# Patient Record
Sex: FEMALE | Race: NATIVE HAWAIIAN OR OTHER PACIFIC ISLANDER
[De-identification: names, ages, dates, MRNs, and addresses within clinical notes are randomized per-mention and may not be internally consistent; named-entity substitution may affect disease eponyms.]

---

## 2017-01-25 ENCOUNTER — HOSPITAL ENCOUNTER (EMERGENCY)
Dept: HOSPITAL 67 - ED | Age: 82
Discharge: HOME | End: 2017-01-25
Payer: COMMERCIAL

## 2017-01-25 VITALS — SYSTOLIC BLOOD PRESSURE: 164 MMHG | DIASTOLIC BLOOD PRESSURE: 54 MMHG | TEMPERATURE: 97.7 F

## 2017-01-25 VITALS — WEIGHT: 145 LBS | BODY MASS INDEX: 28.47 KG/M2 | HEIGHT: 60 IN

## 2017-01-25 DIAGNOSIS — R79.89: ICD-10-CM
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
## 2017-01-25 DIAGNOSIS — R00.1: ICD-10-CM

## 2017-01-25 DIAGNOSIS — R53.1: Primary | ICD-10-CM

## 2017-01-25 LAB
APTT BLD: 25 SECONDS (ref 24.5–33.6)
PLATELET # BLD: 189 K/UL (ref 152–353)
POTASSIUM SERPL-SCNC: 4.7 MMOL/L (ref 3.6–5.2)
SODIUM SERPL-SCNC: 134 MMOL/L (ref 136–145)

## 2017-01-25 PROCEDURE — 36415 COLL VENOUS BLD VENIPUNCTURE: CPT

## 2017-01-25 PROCEDURE — 85027 COMPLETE CBC AUTOMATED: CPT

## 2017-01-25 PROCEDURE — 99284 EMERGENCY DEPT VISIT MOD MDM: CPT

## 2017-01-25 PROCEDURE — 81000 URINALYSIS NONAUTO W/SCOPE: CPT

## 2017-01-25 PROCEDURE — 96360 HYDRATION IV INFUSION INIT: CPT

## 2017-01-25 PROCEDURE — 84484 ASSAY OF TROPONIN QUANT: CPT

## 2017-01-25 PROCEDURE — 80053 COMPREHEN METABOLIC PANEL: CPT

## 2017-01-25 PROCEDURE — 93005 ELECTROCARDIOGRAM TRACING: CPT

## 2017-01-25 PROCEDURE — 85610 PROTHROMBIN TIME: CPT

## 2017-01-25 PROCEDURE — 82550 ASSAY OF CK (CPK): CPT

## 2017-01-25 PROCEDURE — 85730 THROMBOPLASTIN TIME PARTIAL: CPT

## 2017-05-15 ENCOUNTER — HOSPITAL ENCOUNTER (OUTPATIENT)
Dept: HOSPITAL 67 - AMB | Age: 82
Discharge: ANOTHER HOSPITAL | End: 2017-05-15
Payer: COMMERCIAL

## 2017-05-15 ENCOUNTER — HOSPITAL ENCOUNTER (INPATIENT)
Dept: HOSPITAL 67 - ED | Age: 82
LOS: 4 days | Discharge: HOME | DRG: 639 | End: 2017-05-19
Attending: INTERNAL MEDICINE | Admitting: EMERGENCY MEDICINE
Payer: COMMERCIAL

## 2017-05-15 VITALS
HEIGHT: 62 IN | BODY MASS INDEX: 25.4 KG/M2 | SYSTOLIC BLOOD PRESSURE: 138 MMHG | WEIGHT: 138.06 LBS | HEIGHT: 62 IN | DIASTOLIC BLOOD PRESSURE: 89 MMHG | WEIGHT: 138.06 LBS | BODY MASS INDEX: 25.4 KG/M2 | TEMPERATURE: 98.3 F

## 2017-05-15 DIAGNOSIS — E11.649: Primary | ICD-10-CM

## 2017-05-15 DIAGNOSIS — I12.9: ICD-10-CM

## 2017-05-15 DIAGNOSIS — R41.82: ICD-10-CM

## 2017-05-15 DIAGNOSIS — N18.4: ICD-10-CM

## 2017-05-15 DIAGNOSIS — K21.9: ICD-10-CM

## 2017-05-15 DIAGNOSIS — I25.2: ICD-10-CM

## 2017-05-15 DIAGNOSIS — E16.1: Primary | ICD-10-CM

## 2017-05-15 LAB
PLATELET # BLD: 233 K/UL (ref 152–353)
POTASSIUM SERPL-SCNC: 4.1 MMOL/L (ref 3.6–5.2)
SODIUM SERPL-SCNC: 136 MMOL/L (ref 136–145)

## 2017-05-15 PROCEDURE — 96366 THER/PROPH/DIAG IV INF ADDON: CPT

## 2017-05-15 PROCEDURE — 83735 ASSAY OF MAGNESIUM: CPT

## 2017-05-15 PROCEDURE — 96360 HYDRATION IV INFUSION INIT: CPT

## 2017-05-15 PROCEDURE — A0425 GROUND MILEAGE: HCPCS

## 2017-05-15 PROCEDURE — 80053 COMPREHEN METABOLIC PANEL: CPT

## 2017-05-15 PROCEDURE — 96372 THER/PROPH/DIAG INJ SC/IM: CPT

## 2017-05-15 PROCEDURE — 85027 COMPLETE CBC AUTOMATED: CPT

## 2017-05-15 PROCEDURE — 81000 URINALYSIS NONAUTO W/SCOPE: CPT

## 2017-05-15 PROCEDURE — 96365 THER/PROPH/DIAG IV INF INIT: CPT

## 2017-05-15 PROCEDURE — 36415 COLL VENOUS BLD VENIPUNCTURE: CPT

## 2017-05-15 PROCEDURE — A0427 ALS1-EMERGENCY: HCPCS

## 2017-05-15 PROCEDURE — 83036 HEMOGLOBIN GLYCOSYLATED A1C: CPT

## 2017-05-15 PROCEDURE — 99284 EMERGENCY DEPT VISIT MOD MDM: CPT

## 2017-05-15 PROCEDURE — 80048 BASIC METABOLIC PNL TOTAL CA: CPT

## 2017-05-15 PROCEDURE — 82948 REAGENT STRIP/BLOOD GLUCOSE: CPT

## 2017-05-16 VITALS — SYSTOLIC BLOOD PRESSURE: 164 MMHG | DIASTOLIC BLOOD PRESSURE: 58 MMHG | TEMPERATURE: 99 F

## 2017-05-16 VITALS — TEMPERATURE: 98.1 F | SYSTOLIC BLOOD PRESSURE: 184 MMHG | DIASTOLIC BLOOD PRESSURE: 60 MMHG

## 2017-05-16 VITALS — TEMPERATURE: 98 F | DIASTOLIC BLOOD PRESSURE: 45 MMHG | SYSTOLIC BLOOD PRESSURE: 126 MMHG

## 2017-05-16 VITALS — TEMPERATURE: 99 F | SYSTOLIC BLOOD PRESSURE: 158 MMHG | DIASTOLIC BLOOD PRESSURE: 53 MMHG

## 2017-05-16 VITALS — DIASTOLIC BLOOD PRESSURE: 48 MMHG | TEMPERATURE: 98.9 F | SYSTOLIC BLOOD PRESSURE: 147 MMHG

## 2017-05-16 VITALS — DIASTOLIC BLOOD PRESSURE: 56 MMHG | SYSTOLIC BLOOD PRESSURE: 150 MMHG | TEMPERATURE: 99.2 F

## 2017-05-16 VITALS — TEMPERATURE: 98.4 F | SYSTOLIC BLOOD PRESSURE: 146 MMHG | DIASTOLIC BLOOD PRESSURE: 52 MMHG

## 2017-05-16 LAB
PLATELET # BLD: 178 K/UL (ref 152–353)
POTASSIUM SERPL-SCNC: 3.6 MMOL/L (ref 3.6–5.2)
SODIUM SERPL-SCNC: 126 MMOL/L (ref 136–145)

## 2017-05-17 VITALS — TEMPERATURE: 98.9 F | SYSTOLIC BLOOD PRESSURE: 156 MMHG | DIASTOLIC BLOOD PRESSURE: 43 MMHG

## 2017-05-17 VITALS — TEMPERATURE: 99.4 F | DIASTOLIC BLOOD PRESSURE: 50 MMHG | SYSTOLIC BLOOD PRESSURE: 180 MMHG

## 2017-05-17 VITALS — TEMPERATURE: 98.7 F | SYSTOLIC BLOOD PRESSURE: 152 MMHG | DIASTOLIC BLOOD PRESSURE: 53 MMHG

## 2017-05-17 VITALS — DIASTOLIC BLOOD PRESSURE: 54 MMHG | SYSTOLIC BLOOD PRESSURE: 152 MMHG | TEMPERATURE: 99.2 F

## 2017-05-17 VITALS — DIASTOLIC BLOOD PRESSURE: 40 MMHG | TEMPERATURE: 98.7 F | SYSTOLIC BLOOD PRESSURE: 155 MMHG

## 2017-05-17 VITALS — DIASTOLIC BLOOD PRESSURE: 47 MMHG | TEMPERATURE: 99.4 F | SYSTOLIC BLOOD PRESSURE: 150 MMHG

## 2017-05-17 LAB
PLATELET # BLD: 156 K/UL (ref 152–353)
POTASSIUM SERPL-SCNC: 4.1 MMOL/L (ref 3.6–5.2)
SODIUM SERPL-SCNC: 124 MMOL/L (ref 136–145)

## 2017-05-18 VITALS — TEMPERATURE: 97.8 F | SYSTOLIC BLOOD PRESSURE: 182 MMHG | DIASTOLIC BLOOD PRESSURE: 72 MMHG

## 2017-05-18 VITALS — SYSTOLIC BLOOD PRESSURE: 185 MMHG | TEMPERATURE: 98 F | DIASTOLIC BLOOD PRESSURE: 70 MMHG

## 2017-05-18 VITALS — DIASTOLIC BLOOD PRESSURE: 48 MMHG | SYSTOLIC BLOOD PRESSURE: 156 MMHG | TEMPERATURE: 99.1 F

## 2017-05-18 VITALS — SYSTOLIC BLOOD PRESSURE: 172 MMHG | TEMPERATURE: 97.6 F | DIASTOLIC BLOOD PRESSURE: 65 MMHG

## 2017-05-18 VITALS — TEMPERATURE: 98.5 F | DIASTOLIC BLOOD PRESSURE: 54 MMHG | SYSTOLIC BLOOD PRESSURE: 149 MMHG

## 2017-05-18 VITALS — DIASTOLIC BLOOD PRESSURE: 57 MMHG | TEMPERATURE: 98.9 F | SYSTOLIC BLOOD PRESSURE: 157 MMHG

## 2017-05-18 LAB
PLATELET # BLD: 146 K/UL (ref 152–353)
POTASSIUM SERPL-SCNC: 4.3 MMOL/L (ref 3.6–5.2)
SODIUM SERPL-SCNC: 135 MMOL/L (ref 136–145)

## 2017-05-18 NOTE — NUR
WENT INTO PT'S ROOM TO STOP IV FROM BEEPING AND PT WAS LYING IN BED BUT HAD AN
ACCIDENT IN BATHROOM. LARGE BM ON FLOOR IN BATHROOM AND FROM BR TO BED. PT
GIVEN BATH AND LINENS CHANGED. FLOOR CLEANED PER HOUSEKEEPING. PT REORIENTED
TO CALL LIGHT AND PT VERBALZIES UNDERSTANDING.

## 2017-05-19 VITALS — SYSTOLIC BLOOD PRESSURE: 170 MMHG | DIASTOLIC BLOOD PRESSURE: 54 MMHG | TEMPERATURE: 99.3 F

## 2017-05-19 VITALS — SYSTOLIC BLOOD PRESSURE: 173 MMHG | TEMPERATURE: 98.8 F | DIASTOLIC BLOOD PRESSURE: 62 MMHG

## 2017-05-19 VITALS — DIASTOLIC BLOOD PRESSURE: 69 MMHG | TEMPERATURE: 98 F | SYSTOLIC BLOOD PRESSURE: 105 MMHG

## 2017-05-19 VITALS — SYSTOLIC BLOOD PRESSURE: 162 MMHG | TEMPERATURE: 98 F | DIASTOLIC BLOOD PRESSURE: 57 MMHG

## 2017-05-19 LAB
PLATELET # BLD: 156 K/UL (ref 152–353)
POTASSIUM SERPL-SCNC: 3.8 MMOL/L (ref 3.6–5.2)
SODIUM SERPL-SCNC: 137 MMOL/L (ref 136–145)

## 2017-05-19 NOTE — NUR
RESTING IN BED  HOB UP EATING BREAKFAST. NO COMPLAINTS BLOOD SUGAR  112 THIS
AM.  IV FLUIDS INFUSING WITHOUT DIFFICULTY.  FAMILY MEMBERS AT BEDSIDE.

## 2017-05-19 NOTE — NUR
RECIEVED ORDERS EARLIER PT TO BE DISCHARGED. IV D/C CATH INTACT NO REDNESS NO
SWELLING AT SITE. FAMILY MEMBERS HERE TO GET PT. DISCHARGE ORDERS REVIEWED
JOEL THAYER RN.  PT DISCHARGED VIA W/C TO PRIVATE CAR TO GO HOME.

## 2017-08-08 ENCOUNTER — HOSPITAL ENCOUNTER (OUTPATIENT)
Dept: HOSPITAL 67 - RAD | Age: 82
Discharge: HOME | End: 2017-08-08
Attending: NURSE PRACTITIONER
Payer: COMMERCIAL

## 2017-08-08 DIAGNOSIS — M79.642: ICD-10-CM

## 2017-08-08 DIAGNOSIS — M25.511: ICD-10-CM

## 2017-08-08 DIAGNOSIS — M25.512: ICD-10-CM

## 2017-08-08 DIAGNOSIS — M25.532: ICD-10-CM

## 2017-08-08 DIAGNOSIS — M25.531: ICD-10-CM

## 2017-08-08 DIAGNOSIS — M79.641: Primary | ICD-10-CM

## 2017-11-25 ENCOUNTER — HOSPITAL ENCOUNTER (EMERGENCY)
Dept: HOSPITAL 67 - ED | Age: 82
Discharge: HOME | End: 2017-11-25
Payer: COMMERCIAL

## 2017-11-25 ENCOUNTER — HOSPITAL ENCOUNTER (OUTPATIENT)
Dept: HOSPITAL 67 - AMB | Age: 82
Discharge: ANOTHER HOSPITAL | End: 2017-11-25
Payer: COMMERCIAL

## 2017-11-25 VITALS — DIASTOLIC BLOOD PRESSURE: 56 MMHG | SYSTOLIC BLOOD PRESSURE: 135 MMHG | TEMPERATURE: 97.4 F

## 2017-11-25 VITALS — HEIGHT: 62 IN | BODY MASS INDEX: 28.52 KG/M2 | WEIGHT: 155 LBS

## 2017-11-25 DIAGNOSIS — R53.1: ICD-10-CM

## 2017-11-25 DIAGNOSIS — R00.1: ICD-10-CM

## 2017-11-25 DIAGNOSIS — R55: Primary | ICD-10-CM

## 2017-11-25 DIAGNOSIS — E86.0: ICD-10-CM

## 2017-11-25 LAB
APTT BLD: 21.5 SECONDS (ref 24.5–33.6)
PLATELET # BLD: 225 K/UL (ref 152–353)
POTASSIUM SERPL-SCNC: 5.4 MMOL/L (ref 3.6–5.2)
SODIUM SERPL-SCNC: 133 MMOL/L (ref 136–145)

## 2017-11-25 PROCEDURE — A0425 GROUND MILEAGE: HCPCS

## 2017-11-25 PROCEDURE — 85610 PROTHROMBIN TIME: CPT

## 2017-11-25 PROCEDURE — A0427 ALS1-EMERGENCY: HCPCS

## 2017-11-25 PROCEDURE — 93005 ELECTROCARDIOGRAM TRACING: CPT

## 2017-11-25 PROCEDURE — 85730 THROMBOPLASTIN TIME PARTIAL: CPT

## 2017-11-25 PROCEDURE — 99284 EMERGENCY DEPT VISIT MOD MDM: CPT

## 2017-11-25 PROCEDURE — 85027 COMPLETE CBC AUTOMATED: CPT

## 2017-11-25 PROCEDURE — 96360 HYDRATION IV INFUSION INIT: CPT

## 2017-11-25 PROCEDURE — 82550 ASSAY OF CK (CPK): CPT

## 2017-11-25 PROCEDURE — 80053 COMPREHEN METABOLIC PANEL: CPT

## 2017-11-25 PROCEDURE — 84484 ASSAY OF TROPONIN QUANT: CPT

## 2017-11-25 PROCEDURE — 81000 URINALYSIS NONAUTO W/SCOPE: CPT

## 2018-09-21 ENCOUNTER — HOSPITAL ENCOUNTER (INPATIENT)
Dept: HOSPITAL 67 - MED/SURG | Age: 83
LOS: 8 days | Discharge: HOME | DRG: 65 | End: 2018-09-29
Attending: GENERAL PRACTICE | Admitting: NURSE PRACTITIONER
Payer: COMMERCIAL

## 2018-09-21 VITALS — TEMPERATURE: 97.4 F | SYSTOLIC BLOOD PRESSURE: 122 MMHG | DIASTOLIC BLOOD PRESSURE: 75 MMHG

## 2018-09-21 VITALS
HEIGHT: 62 IN | BODY MASS INDEX: 25.3 KG/M2 | HEIGHT: 62 IN | BODY MASS INDEX: 25.3 KG/M2 | WEIGHT: 137.5 LBS | WEIGHT: 137.5 LBS

## 2018-09-21 VITALS — SYSTOLIC BLOOD PRESSURE: 136 MMHG | DIASTOLIC BLOOD PRESSURE: 74 MMHG | TEMPERATURE: 98.4 F

## 2018-09-21 DIAGNOSIS — N18.4: ICD-10-CM

## 2018-09-21 DIAGNOSIS — E86.0: ICD-10-CM

## 2018-09-21 DIAGNOSIS — E11.22: ICD-10-CM

## 2018-09-21 DIAGNOSIS — I63.8: Primary | ICD-10-CM

## 2018-09-21 DIAGNOSIS — I12.9: ICD-10-CM

## 2018-09-21 DIAGNOSIS — N39.0: ICD-10-CM

## 2018-09-21 DIAGNOSIS — M15.8: ICD-10-CM

## 2018-09-21 DIAGNOSIS — E11.649: ICD-10-CM

## 2018-09-21 DIAGNOSIS — R60.9: ICD-10-CM

## 2018-09-21 LAB
APTT BLD: 23.2 SECONDS (ref 24.5–33.6)
PLATELET # BLD: 171 K/UL (ref 152–353)
POTASSIUM SERPL-SCNC: 4.9 MMOL/L (ref 3.6–5.2)
SODIUM SERPL-SCNC: 133 MMOL/L (ref 136–145)

## 2018-09-21 PROCEDURE — 84443 ASSAY THYROID STIM HORMONE: CPT

## 2018-09-21 PROCEDURE — 82746 ASSAY OF FOLIC ACID SERUM: CPT

## 2018-09-21 PROCEDURE — 36415 COLL VENOUS BLD VENIPUNCTURE: CPT

## 2018-09-21 PROCEDURE — 82272 OCCULT BLD FECES 1-3 TESTS: CPT

## 2018-09-21 PROCEDURE — 85027 COMPLETE CBC AUTOMATED: CPT

## 2018-09-21 PROCEDURE — 81000 URINALYSIS NONAUTO W/SCOPE: CPT

## 2018-09-21 PROCEDURE — 85610 PROTHROMBIN TIME: CPT

## 2018-09-21 PROCEDURE — 82948 REAGENT STRIP/BLOOD GLUCOSE: CPT

## 2018-09-21 PROCEDURE — 80053 COMPREHEN METABOLIC PANEL: CPT

## 2018-09-21 PROCEDURE — 83735 ASSAY OF MAGNESIUM: CPT

## 2018-09-21 PROCEDURE — 82607 VITAMIN B-12: CPT

## 2018-09-21 PROCEDURE — 85730 THROMBOPLASTIN TIME PARTIAL: CPT

## 2018-09-21 PROCEDURE — 96372 THER/PROPH/DIAG INJ SC/IM: CPT

## 2018-09-21 PROCEDURE — 93005 ELECTROCARDIOGRAM TRACING: CPT

## 2018-09-22 VITALS — SYSTOLIC BLOOD PRESSURE: 153 MMHG | DIASTOLIC BLOOD PRESSURE: 54 MMHG | TEMPERATURE: 98 F

## 2018-09-22 VITALS — DIASTOLIC BLOOD PRESSURE: 68 MMHG | TEMPERATURE: 97.9 F | SYSTOLIC BLOOD PRESSURE: 130 MMHG

## 2018-09-22 VITALS — SYSTOLIC BLOOD PRESSURE: 137 MMHG | DIASTOLIC BLOOD PRESSURE: 52 MMHG | TEMPERATURE: 98.1 F

## 2018-09-22 VITALS — SYSTOLIC BLOOD PRESSURE: 132 MMHG | DIASTOLIC BLOOD PRESSURE: 60 MMHG | TEMPERATURE: 98.4 F

## 2018-09-22 VITALS — TEMPERATURE: 98.4 F | DIASTOLIC BLOOD PRESSURE: 65 MMHG | SYSTOLIC BLOOD PRESSURE: 145 MMHG

## 2018-09-22 VITALS — DIASTOLIC BLOOD PRESSURE: 55 MMHG | TEMPERATURE: 98.1 F | SYSTOLIC BLOOD PRESSURE: 115 MMHG

## 2018-09-22 LAB
PLATELET # BLD: 154 K/UL (ref 152–353)
POTASSIUM SERPL-SCNC: 4 MMOL/L (ref 3.6–5.2)
SODIUM SERPL-SCNC: 137 MMOL/L (ref 136–145)

## 2018-09-23 VITALS — DIASTOLIC BLOOD PRESSURE: 70 MMHG | TEMPERATURE: 98 F | SYSTOLIC BLOOD PRESSURE: 157 MMHG

## 2018-09-23 VITALS — SYSTOLIC BLOOD PRESSURE: 142 MMHG | DIASTOLIC BLOOD PRESSURE: 53 MMHG | TEMPERATURE: 98.2 F

## 2018-09-23 VITALS — TEMPERATURE: 98.2 F | DIASTOLIC BLOOD PRESSURE: 50 MMHG | SYSTOLIC BLOOD PRESSURE: 162 MMHG

## 2018-09-23 VITALS — DIASTOLIC BLOOD PRESSURE: 63 MMHG | TEMPERATURE: 98 F | SYSTOLIC BLOOD PRESSURE: 133 MMHG

## 2018-09-23 VITALS — TEMPERATURE: 98.3 F | DIASTOLIC BLOOD PRESSURE: 65 MMHG | SYSTOLIC BLOOD PRESSURE: 145 MMHG

## 2018-09-23 VITALS — SYSTOLIC BLOOD PRESSURE: 140 MMHG | DIASTOLIC BLOOD PRESSURE: 50 MMHG | TEMPERATURE: 97.8 F

## 2018-09-23 VITALS — DIASTOLIC BLOOD PRESSURE: 57 MMHG | TEMPERATURE: 98.5 F | SYSTOLIC BLOOD PRESSURE: 156 MMHG

## 2018-09-23 LAB
PLATELET # BLD: 148 K/UL (ref 152–353)
POTASSIUM SERPL-SCNC: 4.2 MMOL/L (ref 3.6–5.2)
SODIUM SERPL-SCNC: 141 MMOL/L (ref 136–145)

## 2018-09-24 VITALS — TEMPERATURE: 98.5 F | SYSTOLIC BLOOD PRESSURE: 141 MMHG | DIASTOLIC BLOOD PRESSURE: 58 MMHG

## 2018-09-24 VITALS — DIASTOLIC BLOOD PRESSURE: 60 MMHG | SYSTOLIC BLOOD PRESSURE: 143 MMHG | TEMPERATURE: 97.9 F

## 2018-09-24 VITALS — DIASTOLIC BLOOD PRESSURE: 57 MMHG | TEMPERATURE: 98.2 F | SYSTOLIC BLOOD PRESSURE: 139 MMHG

## 2018-09-24 VITALS — DIASTOLIC BLOOD PRESSURE: 48 MMHG | TEMPERATURE: 98.9 F | SYSTOLIC BLOOD PRESSURE: 96 MMHG

## 2018-09-24 VITALS — TEMPERATURE: 98.8 F | SYSTOLIC BLOOD PRESSURE: 162 MMHG | DIASTOLIC BLOOD PRESSURE: 64 MMHG

## 2018-09-24 VITALS — SYSTOLIC BLOOD PRESSURE: 139 MMHG | TEMPERATURE: 98.5 F | DIASTOLIC BLOOD PRESSURE: 50 MMHG

## 2018-09-24 LAB
PLATELET # BLD: 153 K/UL (ref 152–353)
POTASSIUM SERPL-SCNC: 4.7 MMOL/L (ref 3.6–5.2)
SODIUM SERPL-SCNC: 141 MMOL/L (ref 136–145)

## 2018-09-25 VITALS — SYSTOLIC BLOOD PRESSURE: 112 MMHG | TEMPERATURE: 98.2 F | DIASTOLIC BLOOD PRESSURE: 43 MMHG

## 2018-09-25 VITALS — DIASTOLIC BLOOD PRESSURE: 47 MMHG | SYSTOLIC BLOOD PRESSURE: 117 MMHG | TEMPERATURE: 98.9 F

## 2018-09-25 VITALS — DIASTOLIC BLOOD PRESSURE: 46 MMHG | TEMPERATURE: 98.2 F | SYSTOLIC BLOOD PRESSURE: 114 MMHG

## 2018-09-25 VITALS — TEMPERATURE: 98.3 F | DIASTOLIC BLOOD PRESSURE: 61 MMHG | SYSTOLIC BLOOD PRESSURE: 106 MMHG

## 2018-09-25 VITALS — SYSTOLIC BLOOD PRESSURE: 176 MMHG | DIASTOLIC BLOOD PRESSURE: 75 MMHG | TEMPERATURE: 100.5 F

## 2018-09-25 LAB
PLATELET # BLD: 145 K/UL (ref 152–353)
POTASSIUM SERPL-SCNC: 4.1 MMOL/L (ref 3.6–5.2)
SODIUM SERPL-SCNC: 143 MMOL/L (ref 136–145)

## 2018-09-26 VITALS — SYSTOLIC BLOOD PRESSURE: 130 MMHG | DIASTOLIC BLOOD PRESSURE: 77 MMHG | TEMPERATURE: 98.7 F

## 2018-09-26 VITALS — DIASTOLIC BLOOD PRESSURE: 55 MMHG | TEMPERATURE: 98.9 F | SYSTOLIC BLOOD PRESSURE: 126 MMHG

## 2018-09-26 VITALS — DIASTOLIC BLOOD PRESSURE: 80 MMHG | SYSTOLIC BLOOD PRESSURE: 186 MMHG | TEMPERATURE: 98 F

## 2018-09-26 VITALS — TEMPERATURE: 98.5 F | SYSTOLIC BLOOD PRESSURE: 127 MMHG | DIASTOLIC BLOOD PRESSURE: 52 MMHG

## 2018-09-26 VITALS — TEMPERATURE: 97.9 F | SYSTOLIC BLOOD PRESSURE: 146 MMHG | DIASTOLIC BLOOD PRESSURE: 74 MMHG

## 2018-09-26 VITALS — SYSTOLIC BLOOD PRESSURE: 130 MMHG | TEMPERATURE: 97.4 F | DIASTOLIC BLOOD PRESSURE: 68 MMHG

## 2018-09-26 VITALS — DIASTOLIC BLOOD PRESSURE: 58 MMHG | SYSTOLIC BLOOD PRESSURE: 169 MMHG | TEMPERATURE: 98.7 F

## 2018-09-26 LAB
APTT BLD: 27.9 SECONDS (ref 24.5–33.6)
PLATELET # BLD: 104 K/UL (ref 152–353)
POTASSIUM SERPL-SCNC: 3.8 MMOL/L (ref 3.6–5.2)
SODIUM SERPL-SCNC: 140 MMOL/L (ref 136–145)

## 2018-09-27 VITALS — TEMPERATURE: 98.4 F | SYSTOLIC BLOOD PRESSURE: 134 MMHG | DIASTOLIC BLOOD PRESSURE: 60 MMHG

## 2018-09-27 VITALS — DIASTOLIC BLOOD PRESSURE: 88 MMHG | TEMPERATURE: 98.2 F | SYSTOLIC BLOOD PRESSURE: 184 MMHG

## 2018-09-27 VITALS — DIASTOLIC BLOOD PRESSURE: 53 MMHG | TEMPERATURE: 98.4 F | SYSTOLIC BLOOD PRESSURE: 151 MMHG

## 2018-09-27 VITALS — TEMPERATURE: 98.2 F | DIASTOLIC BLOOD PRESSURE: 56 MMHG | SYSTOLIC BLOOD PRESSURE: 139 MMHG

## 2018-09-27 VITALS — TEMPERATURE: 98.6 F | SYSTOLIC BLOOD PRESSURE: 136 MMHG | DIASTOLIC BLOOD PRESSURE: 63 MMHG

## 2018-09-27 LAB
PLATELET # BLD: 139 K/UL (ref 152–353)
POTASSIUM SERPL-SCNC: 4.2 MMOL/L (ref 3.6–5.2)
SODIUM SERPL-SCNC: 138 MMOL/L (ref 136–145)

## 2018-09-28 VITALS — SYSTOLIC BLOOD PRESSURE: 161 MMHG | TEMPERATURE: 99.1 F | DIASTOLIC BLOOD PRESSURE: 50 MMHG

## 2018-09-28 VITALS — DIASTOLIC BLOOD PRESSURE: 68 MMHG | SYSTOLIC BLOOD PRESSURE: 122 MMHG | TEMPERATURE: 98.4 F

## 2018-09-28 VITALS — TEMPERATURE: 98.9 F | DIASTOLIC BLOOD PRESSURE: 52 MMHG | SYSTOLIC BLOOD PRESSURE: 131 MMHG

## 2018-09-28 VITALS — SYSTOLIC BLOOD PRESSURE: 142 MMHG | TEMPERATURE: 98.6 F | DIASTOLIC BLOOD PRESSURE: 72 MMHG

## 2018-09-28 VITALS — DIASTOLIC BLOOD PRESSURE: 45 MMHG | SYSTOLIC BLOOD PRESSURE: 134 MMHG | TEMPERATURE: 99.1 F

## 2018-09-28 VITALS — DIASTOLIC BLOOD PRESSURE: 57 MMHG | SYSTOLIC BLOOD PRESSURE: 108 MMHG | TEMPERATURE: 97.9 F

## 2018-09-28 LAB
PLATELET # BLD: 133 K/UL (ref 152–353)
POTASSIUM SERPL-SCNC: 3.7 MMOL/L (ref 3.6–5.2)
SODIUM SERPL-SCNC: 141 MMOL/L (ref 136–145)

## 2018-09-29 VITALS — SYSTOLIC BLOOD PRESSURE: 144 MMHG | TEMPERATURE: 98.7 F | DIASTOLIC BLOOD PRESSURE: 72 MMHG

## 2018-09-29 VITALS — TEMPERATURE: 98.8 F | SYSTOLIC BLOOD PRESSURE: 136 MMHG | DIASTOLIC BLOOD PRESSURE: 65 MMHG

## 2018-09-29 VITALS — DIASTOLIC BLOOD PRESSURE: 93 MMHG | SYSTOLIC BLOOD PRESSURE: 172 MMHG | TEMPERATURE: 98.9 F

## 2018-09-29 VITALS — TEMPERATURE: 99.4 F | SYSTOLIC BLOOD PRESSURE: 122 MMHG | DIASTOLIC BLOOD PRESSURE: 46 MMHG

## 2018-09-29 LAB
PLATELET # BLD: 155 K/UL (ref 152–353)
POTASSIUM SERPL-SCNC: 3.5 MMOL/L (ref 3.6–5.2)
SODIUM SERPL-SCNC: 143 MMOL/L (ref 136–145)

## 2019-01-03 ENCOUNTER — HOSPITAL ENCOUNTER (OUTPATIENT)
Dept: HOSPITAL 67 - AMB | Age: 84
Discharge: ANOTHER HOSPITAL | End: 2019-01-03
Payer: COMMERCIAL

## 2019-01-03 ENCOUNTER — HOSPITAL ENCOUNTER (INPATIENT)
Dept: HOSPITAL 67 - ED | Age: 84
LOS: 4 days | Discharge: HOME | DRG: 69 | End: 2019-01-07
Attending: GENERAL PRACTICE | Admitting: GENERAL PRACTICE
Payer: COMMERCIAL

## 2019-01-03 VITALS
WEIGHT: 136.38 LBS | BODY MASS INDEX: 26.77 KG/M2 | HEIGHT: 60 IN | SYSTOLIC BLOOD PRESSURE: 156 MMHG | HEIGHT: 60 IN | TEMPERATURE: 97.2 F | BODY MASS INDEX: 26.77 KG/M2 | DIASTOLIC BLOOD PRESSURE: 56 MMHG | WEIGHT: 136.38 LBS

## 2019-01-03 VITALS — SYSTOLIC BLOOD PRESSURE: 159 MMHG | DIASTOLIC BLOOD PRESSURE: 48 MMHG

## 2019-01-03 VITALS — DIASTOLIC BLOOD PRESSURE: 46 MMHG | SYSTOLIC BLOOD PRESSURE: 127 MMHG

## 2019-01-03 VITALS — TEMPERATURE: 97.9 F | SYSTOLIC BLOOD PRESSURE: 176 MMHG | DIASTOLIC BLOOD PRESSURE: 49 MMHG

## 2019-01-03 VITALS — DIASTOLIC BLOOD PRESSURE: 54 MMHG | SYSTOLIC BLOOD PRESSURE: 179 MMHG | TEMPERATURE: 97.9 F

## 2019-01-03 VITALS — SYSTOLIC BLOOD PRESSURE: 133 MMHG | DIASTOLIC BLOOD PRESSURE: 49 MMHG

## 2019-01-03 VITALS — TEMPERATURE: 98.4 F | SYSTOLIC BLOOD PRESSURE: 164 MMHG | DIASTOLIC BLOOD PRESSURE: 61 MMHG

## 2019-01-03 VITALS — SYSTOLIC BLOOD PRESSURE: 158 MMHG | DIASTOLIC BLOOD PRESSURE: 51 MMHG

## 2019-01-03 VITALS — TEMPERATURE: 97.9 F | DIASTOLIC BLOOD PRESSURE: 49 MMHG | SYSTOLIC BLOOD PRESSURE: 176 MMHG

## 2019-01-03 VITALS — DIASTOLIC BLOOD PRESSURE: 39 MMHG | SYSTOLIC BLOOD PRESSURE: 144 MMHG

## 2019-01-03 VITALS — DIASTOLIC BLOOD PRESSURE: 45 MMHG | SYSTOLIC BLOOD PRESSURE: 175 MMHG

## 2019-01-03 VITALS — DIASTOLIC BLOOD PRESSURE: 46 MMHG | SYSTOLIC BLOOD PRESSURE: 162 MMHG

## 2019-01-03 VITALS — DIASTOLIC BLOOD PRESSURE: 45 MMHG | SYSTOLIC BLOOD PRESSURE: 128 MMHG

## 2019-01-03 VITALS — SYSTOLIC BLOOD PRESSURE: 140 MMHG | DIASTOLIC BLOOD PRESSURE: 38 MMHG

## 2019-01-03 VITALS — DIASTOLIC BLOOD PRESSURE: 43 MMHG | SYSTOLIC BLOOD PRESSURE: 149 MMHG

## 2019-01-03 DIAGNOSIS — I10: ICD-10-CM

## 2019-01-03 DIAGNOSIS — F03.90: ICD-10-CM

## 2019-01-03 DIAGNOSIS — E86.0: ICD-10-CM

## 2019-01-03 DIAGNOSIS — G45.8: Primary | ICD-10-CM

## 2019-01-03 DIAGNOSIS — R00.1: ICD-10-CM

## 2019-01-03 DIAGNOSIS — Z86.73: ICD-10-CM

## 2019-01-03 DIAGNOSIS — R55: Primary | ICD-10-CM

## 2019-01-03 DIAGNOSIS — M15.8: ICD-10-CM

## 2019-01-03 DIAGNOSIS — E11.9: ICD-10-CM

## 2019-01-03 LAB
PLATELET # BLD: 207 K/UL (ref 152–353)
POTASSIUM SERPL-SCNC: 5 MMOL/L (ref 3.6–5.2)
SODIUM SERPL-SCNC: 138 MMOL/L (ref 136–145)

## 2019-01-03 PROCEDURE — 96365 THER/PROPH/DIAG IV INF INIT: CPT

## 2019-01-03 PROCEDURE — A0427 ALS1-EMERGENCY: HCPCS

## 2019-01-03 PROCEDURE — 36415 COLL VENOUS BLD VENIPUNCTURE: CPT

## 2019-01-03 PROCEDURE — A0425 GROUND MILEAGE: HCPCS

## 2019-01-03 PROCEDURE — 83036 HEMOGLOBIN GLYCOSYLATED A1C: CPT

## 2019-01-03 PROCEDURE — 85027 COMPLETE CBC AUTOMATED: CPT

## 2019-01-03 PROCEDURE — 84484 ASSAY OF TROPONIN QUANT: CPT

## 2019-01-03 PROCEDURE — 83735 ASSAY OF MAGNESIUM: CPT

## 2019-01-03 PROCEDURE — 96374 THER/PROPH/DIAG INJ IV PUSH: CPT

## 2019-01-03 PROCEDURE — 51702 INSERT TEMP BLADDER CATH: CPT

## 2019-01-03 PROCEDURE — 93005 ELECTROCARDIOGRAM TRACING: CPT

## 2019-01-03 PROCEDURE — 80053 COMPREHEN METABOLIC PANEL: CPT

## 2019-01-03 PROCEDURE — 81000 URINALYSIS NONAUTO W/SCOPE: CPT

## 2019-01-03 PROCEDURE — 83880 ASSAY OF NATRIURETIC PEPTIDE: CPT

## 2019-01-03 PROCEDURE — 80048 BASIC METABOLIC PNL TOTAL CA: CPT

## 2019-01-03 PROCEDURE — 99285 EMERGENCY DEPT VISIT HI MDM: CPT

## 2019-01-03 PROCEDURE — 82553 CREATINE MB FRACTION: CPT

## 2019-01-03 PROCEDURE — 96361 HYDRATE IV INFUSION ADD-ON: CPT

## 2019-01-03 PROCEDURE — 82550 ASSAY OF CK (CPK): CPT

## 2019-01-04 VITALS — DIASTOLIC BLOOD PRESSURE: 56 MMHG | SYSTOLIC BLOOD PRESSURE: 155 MMHG

## 2019-01-04 VITALS — SYSTOLIC BLOOD PRESSURE: 146 MMHG | TEMPERATURE: 98.4 F | DIASTOLIC BLOOD PRESSURE: 44 MMHG

## 2019-01-04 VITALS — DIASTOLIC BLOOD PRESSURE: 48 MMHG | TEMPERATURE: 98.4 F | SYSTOLIC BLOOD PRESSURE: 158 MMHG

## 2019-01-04 VITALS — SYSTOLIC BLOOD PRESSURE: 129 MMHG | DIASTOLIC BLOOD PRESSURE: 43 MMHG

## 2019-01-04 VITALS — SYSTOLIC BLOOD PRESSURE: 153 MMHG | DIASTOLIC BLOOD PRESSURE: 92 MMHG

## 2019-01-04 VITALS — DIASTOLIC BLOOD PRESSURE: 56 MMHG | SYSTOLIC BLOOD PRESSURE: 121 MMHG

## 2019-01-04 VITALS — DIASTOLIC BLOOD PRESSURE: 42 MMHG | SYSTOLIC BLOOD PRESSURE: 159 MMHG

## 2019-01-04 VITALS — SYSTOLIC BLOOD PRESSURE: 128 MMHG | DIASTOLIC BLOOD PRESSURE: 59 MMHG

## 2019-01-04 VITALS — DIASTOLIC BLOOD PRESSURE: 46 MMHG | TEMPERATURE: 98.4 F | SYSTOLIC BLOOD PRESSURE: 159 MMHG

## 2019-01-04 VITALS — DIASTOLIC BLOOD PRESSURE: 39 MMHG | SYSTOLIC BLOOD PRESSURE: 126 MMHG

## 2019-01-04 VITALS — TEMPERATURE: 99.1 F | SYSTOLIC BLOOD PRESSURE: 143 MMHG | DIASTOLIC BLOOD PRESSURE: 98 MMHG

## 2019-01-04 VITALS — SYSTOLIC BLOOD PRESSURE: 177 MMHG | DIASTOLIC BLOOD PRESSURE: 110 MMHG

## 2019-01-04 VITALS — SYSTOLIC BLOOD PRESSURE: 150 MMHG | DIASTOLIC BLOOD PRESSURE: 65 MMHG

## 2019-01-04 VITALS — DIASTOLIC BLOOD PRESSURE: 38 MMHG | TEMPERATURE: 97.8 F | SYSTOLIC BLOOD PRESSURE: 125 MMHG

## 2019-01-04 VITALS — DIASTOLIC BLOOD PRESSURE: 34 MMHG | TEMPERATURE: 99.1 F | SYSTOLIC BLOOD PRESSURE: 138 MMHG

## 2019-01-04 VITALS — SYSTOLIC BLOOD PRESSURE: 130 MMHG | DIASTOLIC BLOOD PRESSURE: 88 MMHG

## 2019-01-04 VITALS — SYSTOLIC BLOOD PRESSURE: 149 MMHG | DIASTOLIC BLOOD PRESSURE: 45 MMHG

## 2019-01-04 VITALS — DIASTOLIC BLOOD PRESSURE: 42 MMHG | TEMPERATURE: 98.1 F | SYSTOLIC BLOOD PRESSURE: 149 MMHG

## 2019-01-04 VITALS — SYSTOLIC BLOOD PRESSURE: 178 MMHG | DIASTOLIC BLOOD PRESSURE: 51 MMHG

## 2019-01-04 VITALS — SYSTOLIC BLOOD PRESSURE: 153 MMHG | DIASTOLIC BLOOD PRESSURE: 45 MMHG

## 2019-01-04 VITALS — SYSTOLIC BLOOD PRESSURE: 134 MMHG | TEMPERATURE: 98.2 F | DIASTOLIC BLOOD PRESSURE: 50 MMHG

## 2019-01-04 VITALS — DIASTOLIC BLOOD PRESSURE: 46 MMHG | SYSTOLIC BLOOD PRESSURE: 155 MMHG

## 2019-01-04 VITALS — SYSTOLIC BLOOD PRESSURE: 167 MMHG | DIASTOLIC BLOOD PRESSURE: 50 MMHG

## 2019-01-04 VITALS — SYSTOLIC BLOOD PRESSURE: 129 MMHG | DIASTOLIC BLOOD PRESSURE: 56 MMHG

## 2019-01-04 LAB
PLATELET # BLD: 153 K/UL (ref 152–353)
POTASSIUM SERPL-SCNC: 4.2 MMOL/L (ref 3.6–5.2)
SODIUM SERPL-SCNC: 141 MMOL/L (ref 136–145)

## 2019-01-05 VITALS — TEMPERATURE: 98.2 F

## 2019-01-05 VITALS — DIASTOLIC BLOOD PRESSURE: 80 MMHG | SYSTOLIC BLOOD PRESSURE: 166 MMHG | TEMPERATURE: 98.2 F

## 2019-01-05 VITALS — SYSTOLIC BLOOD PRESSURE: 160 MMHG | DIASTOLIC BLOOD PRESSURE: 88 MMHG

## 2019-01-05 VITALS — DIASTOLIC BLOOD PRESSURE: 100 MMHG | SYSTOLIC BLOOD PRESSURE: 198 MMHG | TEMPERATURE: 98.5 F

## 2019-01-05 VITALS — DIASTOLIC BLOOD PRESSURE: 82 MMHG | SYSTOLIC BLOOD PRESSURE: 176 MMHG

## 2019-01-06 VITALS — DIASTOLIC BLOOD PRESSURE: 46 MMHG | SYSTOLIC BLOOD PRESSURE: 126 MMHG | TEMPERATURE: 98.5 F

## 2019-01-06 VITALS — DIASTOLIC BLOOD PRESSURE: 50 MMHG | TEMPERATURE: 98.3 F | SYSTOLIC BLOOD PRESSURE: 143 MMHG

## 2019-01-06 VITALS — SYSTOLIC BLOOD PRESSURE: 155 MMHG | DIASTOLIC BLOOD PRESSURE: 49 MMHG | TEMPERATURE: 98 F

## 2019-01-06 VITALS — DIASTOLIC BLOOD PRESSURE: 44 MMHG | SYSTOLIC BLOOD PRESSURE: 132 MMHG | TEMPERATURE: 98.3 F

## 2019-01-06 VITALS — TEMPERATURE: 98.2 F | SYSTOLIC BLOOD PRESSURE: 151 MMHG | DIASTOLIC BLOOD PRESSURE: 45 MMHG

## 2019-01-06 VITALS — TEMPERATURE: 98.2 F | DIASTOLIC BLOOD PRESSURE: 50 MMHG | SYSTOLIC BLOOD PRESSURE: 158 MMHG

## 2019-01-06 VITALS — SYSTOLIC BLOOD PRESSURE: 161 MMHG | TEMPERATURE: 97.9 F | DIASTOLIC BLOOD PRESSURE: 91 MMHG

## 2019-01-06 LAB
PLATELET # BLD: 159 K/UL (ref 152–353)
POTASSIUM SERPL-SCNC: 4.1 MMOL/L (ref 3.6–5.2)
SODIUM SERPL-SCNC: 137 MMOL/L (ref 136–145)

## 2019-01-07 VITALS — DIASTOLIC BLOOD PRESSURE: 50 MMHG | SYSTOLIC BLOOD PRESSURE: 146 MMHG | TEMPERATURE: 97.9 F

## 2019-01-07 VITALS — TEMPERATURE: 98.1 F | DIASTOLIC BLOOD PRESSURE: 51 MMHG | SYSTOLIC BLOOD PRESSURE: 136 MMHG

## 2019-01-07 VITALS — TEMPERATURE: 98.5 F | SYSTOLIC BLOOD PRESSURE: 110 MMHG | DIASTOLIC BLOOD PRESSURE: 40 MMHG

## 2019-01-07 LAB
PLATELET # BLD: 172 K/UL (ref 152–353)
POTASSIUM SERPL-SCNC: 4 MMOL/L (ref 3.6–5.2)
SODIUM SERPL-SCNC: 137 MMOL/L (ref 136–145)

## 2019-08-16 ENCOUNTER — HOSPITAL ENCOUNTER (OUTPATIENT)
Dept: HOSPITAL 67 - US | Age: 84
Discharge: HOME | End: 2019-08-16
Attending: NURSE PRACTITIONER
Payer: COMMERCIAL

## 2019-08-16 DIAGNOSIS — M79.604: Primary | ICD-10-CM

## 2019-11-24 ENCOUNTER — HOSPITAL ENCOUNTER (EMERGENCY)
Dept: HOSPITAL 67 - ED | Age: 84
Discharge: HOME | End: 2019-11-24
Payer: COMMERCIAL

## 2019-11-24 VITALS — HEIGHT: 62 IN | WEIGHT: 136 LBS | BODY MASS INDEX: 25.03 KG/M2

## 2019-11-24 VITALS — DIASTOLIC BLOOD PRESSURE: 96 MMHG | SYSTOLIC BLOOD PRESSURE: 180 MMHG | TEMPERATURE: 97.6 F

## 2019-11-24 DIAGNOSIS — L03.115: Primary | ICD-10-CM

## 2019-11-24 DIAGNOSIS — M79.671: ICD-10-CM

## 2019-11-24 LAB
APTT BLD: 24.5 SECONDS (ref 24.5–33.6)
PLATELET # BLD: 162 K/UL (ref 152–353)
POTASSIUM SERPL-SCNC: 4.8 MMOL/L (ref 3.6–5.2)
SODIUM SERPL-SCNC: 141 MMOL/L (ref 136–145)

## 2019-11-24 PROCEDURE — 96365 THER/PROPH/DIAG IV INF INIT: CPT

## 2019-11-24 PROCEDURE — 81000 URINALYSIS NONAUTO W/SCOPE: CPT

## 2019-11-24 PROCEDURE — 80053 COMPREHEN METABOLIC PANEL: CPT

## 2019-11-24 PROCEDURE — 99284 EMERGENCY DEPT VISIT MOD MDM: CPT

## 2019-11-24 PROCEDURE — 85027 COMPLETE CBC AUTOMATED: CPT

## 2019-11-24 PROCEDURE — 85610 PROTHROMBIN TIME: CPT

## 2019-11-24 PROCEDURE — 83605 ASSAY OF LACTIC ACID: CPT

## 2019-11-24 PROCEDURE — 85730 THROMBOPLASTIN TIME PARTIAL: CPT

## 2019-11-24 PROCEDURE — 87040 BLOOD CULTURE FOR BACTERIA: CPT

## 2019-11-24 PROCEDURE — 36415 COLL VENOUS BLD VENIPUNCTURE: CPT

## 2019-12-04 ENCOUNTER — HOSPITAL ENCOUNTER (INPATIENT)
Dept: HOSPITAL 67 - ED | Age: 84
LOS: 2 days | Discharge: HOME | DRG: 811 | End: 2019-12-06
Attending: INTERNAL MEDICINE | Admitting: EMERGENCY MEDICINE
Payer: COMMERCIAL

## 2019-12-04 ENCOUNTER — HOSPITAL ENCOUNTER (OUTPATIENT)
Dept: HOSPITAL 67 - AMB | Age: 84
Discharge: ANOTHER HOSPITAL | End: 2019-12-04
Payer: COMMERCIAL

## 2019-12-04 VITALS — TEMPERATURE: 97.7 F | DIASTOLIC BLOOD PRESSURE: 91 MMHG | SYSTOLIC BLOOD PRESSURE: 139 MMHG

## 2019-12-04 VITALS — DIASTOLIC BLOOD PRESSURE: 82 MMHG | SYSTOLIC BLOOD PRESSURE: 119 MMHG

## 2019-12-04 VITALS — DIASTOLIC BLOOD PRESSURE: 48 MMHG | SYSTOLIC BLOOD PRESSURE: 102 MMHG | TEMPERATURE: 97.7 F

## 2019-12-04 VITALS — DIASTOLIC BLOOD PRESSURE: 43 MMHG | SYSTOLIC BLOOD PRESSURE: 139 MMHG

## 2019-12-04 VITALS — SYSTOLIC BLOOD PRESSURE: 106 MMHG | TEMPERATURE: 97.9 F | DIASTOLIC BLOOD PRESSURE: 36 MMHG

## 2019-12-04 VITALS — SYSTOLIC BLOOD PRESSURE: 119 MMHG | DIASTOLIC BLOOD PRESSURE: 29 MMHG

## 2019-12-04 VITALS
HEIGHT: 62 IN | WEIGHT: 141.31 LBS | BODY MASS INDEX: 26.01 KG/M2 | HEIGHT: 62 IN | BODY MASS INDEX: 26.01 KG/M2 | WEIGHT: 141.31 LBS

## 2019-12-04 VITALS — SYSTOLIC BLOOD PRESSURE: 151 MMHG | DIASTOLIC BLOOD PRESSURE: 70 MMHG

## 2019-12-04 DIAGNOSIS — K29.80: ICD-10-CM

## 2019-12-04 DIAGNOSIS — K63.5: ICD-10-CM

## 2019-12-04 DIAGNOSIS — R55: ICD-10-CM

## 2019-12-04 DIAGNOSIS — K29.01: ICD-10-CM

## 2019-12-04 DIAGNOSIS — D62: Primary | ICD-10-CM

## 2019-12-04 DIAGNOSIS — I12.9: ICD-10-CM

## 2019-12-04 DIAGNOSIS — I25.2: ICD-10-CM

## 2019-12-04 DIAGNOSIS — E11.22: ICD-10-CM

## 2019-12-04 DIAGNOSIS — N17.8: ICD-10-CM

## 2019-12-04 DIAGNOSIS — I25.10: ICD-10-CM

## 2019-12-04 DIAGNOSIS — R53.1: Primary | ICD-10-CM

## 2019-12-04 DIAGNOSIS — N18.4: ICD-10-CM

## 2019-12-04 DIAGNOSIS — K64.0: ICD-10-CM

## 2019-12-04 DIAGNOSIS — K57.30: ICD-10-CM

## 2019-12-04 DIAGNOSIS — Z86.73: ICD-10-CM

## 2019-12-04 DIAGNOSIS — F03.90: ICD-10-CM

## 2019-12-04 LAB
PLATELET # BLD: 210 K/UL (ref 152–353)
POTASSIUM SERPL-SCNC: 5.1 MMOL/L (ref 3.6–5.2)
SODIUM SERPL-SCNC: 137 MMOL/L (ref 136–145)

## 2019-12-04 PROCEDURE — 96360 HYDRATION IV INFUSION INIT: CPT

## 2019-12-04 PROCEDURE — 86850 RBC ANTIBODY SCREEN: CPT

## 2019-12-04 PROCEDURE — 82550 ASSAY OF CK (CPK): CPT

## 2019-12-04 PROCEDURE — P9016 RBC LEUKOCYTES REDUCED: HCPCS

## 2019-12-04 PROCEDURE — 86922 COMPATIBILITY TEST ANTIGLOB: CPT

## 2019-12-04 PROCEDURE — A0429 BLS-EMERGENCY: HCPCS

## 2019-12-04 PROCEDURE — 96374 THER/PROPH/DIAG INJ IV PUSH: CPT

## 2019-12-04 PROCEDURE — 81000 URINALYSIS NONAUTO W/SCOPE: CPT

## 2019-12-04 PROCEDURE — 86901 BLOOD TYPING SEROLOGIC RH(D): CPT

## 2019-12-04 PROCEDURE — 30233N1 TRANSFUSION OF NONAUTOLOGOUS RED BLOOD CELLS INTO PERIPHERAL VEIN, PERCUTANEOUS APPROACH: ICD-10-PCS | Performed by: INTERNAL MEDICINE

## 2019-12-04 PROCEDURE — A0425 GROUND MILEAGE: HCPCS

## 2019-12-04 PROCEDURE — 80053 COMPREHEN METABOLIC PANEL: CPT

## 2019-12-04 PROCEDURE — 99284 EMERGENCY DEPT VISIT MOD MDM: CPT

## 2019-12-04 PROCEDURE — 93005 ELECTROCARDIOGRAM TRACING: CPT

## 2019-12-04 PROCEDURE — 83605 ASSAY OF LACTIC ACID: CPT

## 2019-12-04 PROCEDURE — 36415 COLL VENOUS BLD VENIPUNCTURE: CPT

## 2019-12-04 PROCEDURE — 87040 BLOOD CULTURE FOR BACTERIA: CPT

## 2019-12-04 PROCEDURE — 85730 THROMBOPLASTIN TIME PARTIAL: CPT

## 2019-12-04 PROCEDURE — 85610 PROTHROMBIN TIME: CPT

## 2019-12-04 PROCEDURE — 96375 TX/PRO/DX INJ NEW DRUG ADDON: CPT

## 2019-12-04 PROCEDURE — 85027 COMPLETE CBC AUTOMATED: CPT

## 2019-12-04 PROCEDURE — 82272 OCCULT BLD FECES 1-3 TESTS: CPT

## 2019-12-04 PROCEDURE — 80048 BASIC METABOLIC PNL TOTAL CA: CPT

## 2019-12-04 PROCEDURE — 84484 ASSAY OF TROPONIN QUANT: CPT

## 2019-12-04 PROCEDURE — 86900 BLOOD TYPING SEROLOGIC ABO: CPT

## 2019-12-04 PROCEDURE — 93306 TTE W/DOPPLER COMPLETE: CPT

## 2019-12-04 PROCEDURE — 83735 ASSAY OF MAGNESIUM: CPT

## 2019-12-05 VITALS — DIASTOLIC BLOOD PRESSURE: 36 MMHG | SYSTOLIC BLOOD PRESSURE: 117 MMHG | TEMPERATURE: 98 F

## 2019-12-05 VITALS — DIASTOLIC BLOOD PRESSURE: 41 MMHG | SYSTOLIC BLOOD PRESSURE: 114 MMHG

## 2019-12-05 VITALS — SYSTOLIC BLOOD PRESSURE: 137 MMHG | DIASTOLIC BLOOD PRESSURE: 50 MMHG | TEMPERATURE: 98.2 F

## 2019-12-05 VITALS — DIASTOLIC BLOOD PRESSURE: 49 MMHG | SYSTOLIC BLOOD PRESSURE: 119 MMHG

## 2019-12-05 VITALS — SYSTOLIC BLOOD PRESSURE: 118 MMHG | DIASTOLIC BLOOD PRESSURE: 43 MMHG

## 2019-12-05 VITALS — DIASTOLIC BLOOD PRESSURE: 53 MMHG | SYSTOLIC BLOOD PRESSURE: 125 MMHG

## 2019-12-05 VITALS — TEMPERATURE: 97.8 F | SYSTOLIC BLOOD PRESSURE: 141 MMHG | DIASTOLIC BLOOD PRESSURE: 73 MMHG

## 2019-12-05 VITALS — TEMPERATURE: 98.2 F | SYSTOLIC BLOOD PRESSURE: 125 MMHG | DIASTOLIC BLOOD PRESSURE: 45 MMHG

## 2019-12-05 VITALS — SYSTOLIC BLOOD PRESSURE: 100 MMHG | DIASTOLIC BLOOD PRESSURE: 43 MMHG | TEMPERATURE: 98.1 F

## 2019-12-05 VITALS — DIASTOLIC BLOOD PRESSURE: 48 MMHG | TEMPERATURE: 99 F | SYSTOLIC BLOOD PRESSURE: 153 MMHG

## 2019-12-05 VITALS — DIASTOLIC BLOOD PRESSURE: 45 MMHG | SYSTOLIC BLOOD PRESSURE: 111 MMHG | TEMPERATURE: 98.5 F

## 2019-12-05 VITALS — SYSTOLIC BLOOD PRESSURE: 124 MMHG | TEMPERATURE: 98.5 F | DIASTOLIC BLOOD PRESSURE: 48 MMHG

## 2019-12-05 LAB
APTT BLD: 22.6 SECONDS (ref 24.5–33.6)
PLATELET # BLD: 144 K/UL (ref 152–353)
POTASSIUM SERPL-SCNC: 4 MMOL/L (ref 3.6–5.2)
SODIUM SERPL-SCNC: 142 MMOL/L (ref 136–145)

## 2019-12-05 PROCEDURE — 0DBK8ZX EXCISION OF ASCENDING COLON, VIA NATURAL OR ARTIFICIAL OPENING ENDOSCOPIC, DIAGNOSTIC: ICD-10-PCS | Performed by: SURGERY

## 2019-12-05 PROCEDURE — 30233N1 TRANSFUSION OF NONAUTOLOGOUS RED BLOOD CELLS INTO PERIPHERAL VEIN, PERCUTANEOUS APPROACH: ICD-10-PCS | Performed by: INTERNAL MEDICINE

## 2019-12-05 PROCEDURE — 0DB98ZX EXCISION OF DUODENUM, VIA NATURAL OR ARTIFICIAL OPENING ENDOSCOPIC, DIAGNOSTIC: ICD-10-PCS | Performed by: SURGERY

## 2019-12-05 PROCEDURE — 0DB68ZX EXCISION OF STOMACH, VIA NATURAL OR ARTIFICIAL OPENING ENDOSCOPIC, DIAGNOSTIC: ICD-10-PCS | Performed by: SURGERY

## 2019-12-05 PROCEDURE — 0DBH8ZX EXCISION OF CECUM, VIA NATURAL OR ARTIFICIAL OPENING ENDOSCOPIC, DIAGNOSTIC: ICD-10-PCS | Performed by: SURGERY

## 2019-12-06 VITALS — DIASTOLIC BLOOD PRESSURE: 46 MMHG | SYSTOLIC BLOOD PRESSURE: 141 MMHG | TEMPERATURE: 98.7 F

## 2019-12-06 VITALS — TEMPERATURE: 98.7 F

## 2019-12-06 VITALS — SYSTOLIC BLOOD PRESSURE: 120 MMHG | TEMPERATURE: 98.8 F | DIASTOLIC BLOOD PRESSURE: 40 MMHG

## 2019-12-06 LAB
PLATELET # BLD: 122 K/UL (ref 152–353)
POTASSIUM SERPL-SCNC: 4.3 MMOL/L (ref 3.6–5.2)
SODIUM SERPL-SCNC: 139 MMOL/L (ref 136–145)

## 2019-12-06 PROCEDURE — 30233N1 TRANSFUSION OF NONAUTOLOGOUS RED BLOOD CELLS INTO PERIPHERAL VEIN, PERCUTANEOUS APPROACH: ICD-10-PCS | Performed by: INTERNAL MEDICINE

## 2022-10-12 ENCOUNTER — HOSPITAL ENCOUNTER (EMERGENCY)
Dept: HOSPITAL 67 - ED | Age: 87
Discharge: HOME | End: 2022-10-12
Payer: COMMERCIAL

## 2022-10-12 VITALS — SYSTOLIC BLOOD PRESSURE: 173 MMHG | DIASTOLIC BLOOD PRESSURE: 53 MMHG

## 2022-10-12 VITALS — HEIGHT: 62 IN | BODY MASS INDEX: 25.95 KG/M2 | WEIGHT: 141 LBS
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
## 2022-10-12 DIAGNOSIS — S00.03XA: Primary | ICD-10-CM
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
## 2022-10-12 LAB
PLATELET # BLD: 203 K/UL (ref 152–353)
POTASSIUM SERPL-SCNC: 4.9 MMOL/L (ref 3.6–5.2)
SODIUM SERPL-SCNC: 136 MMOL/L (ref 136–145)

## 2022-10-12 PROCEDURE — 99283 EMERGENCY DEPT VISIT LOW MDM: CPT

## 2022-10-12 PROCEDURE — 85027 COMPLETE CBC AUTOMATED: CPT

## 2022-10-12 PROCEDURE — 80053 COMPREHEN METABOLIC PANEL: CPT

## 2022-10-12 PROCEDURE — 81002 URINALYSIS NONAUTO W/O SCOPE: CPT
